# Patient Record
(demographics unavailable — no encounter records)

---

## 2024-11-01 NOTE — REVIEW OF SYSTEMS
[Diarrhea: Grade 0] : Diarrhea: Grade 0 [Joint Pain] : joint pain [Muscle Pain] : muscle pain [Negative] : Allergic/Immunologic

## 2024-11-01 NOTE — REASON FOR VISIT
[Other Location: e.g. School (Enter Location, City,State)___] : at [unfilled], at the time of the visit. [Medical Office: ([facility])___] : at the medical office located in

## 2024-11-01 NOTE — REASON FOR VISIT
[Other Location: e.g. School (Enter Location, City,State)___] : at [unfilled], at the time of the visit. [[place]] : at the medical office located in

## 2024-11-01 NOTE — REASON FOR VISIT
[Other Location: e.g. School (Enter Location, City,State)___] : at [unfilled], at the time of the visit. [Medical Office: ([facility])___] : at the medical office located in

## 2024-11-01 NOTE — REASON FOR VISIT
[Other Location: e.g. School (Enter Location, City,State)___] : at [unfilled], at the time of the visit. [Medical Office: ([facility])___] : at the medical office located in

## 2024-11-05 NOTE — BEGINNING OF VISIT
[0] : 2) Feeling down, depressed, or hopeless: Not at all (0) [[id number]] : 0 [Pain Scale: ___] : On a scale of 1-10, today the patient's pain is a(n) [unfilled]. [Never] : Never [Reviewed, no changes] : Reviewed, no changes

## 2024-11-05 NOTE — BEGINNING OF VISIT
[0] : 2) Feeling down, depressed, or hopeless: Not at all (0) [[id number]] : 0 [Pain Scale: ___] : On a scale of 1-10, today the patient's pain is a(n) [unfilled]. [Never] : Never [Reviewed, no changes] : Reviewed, no changes

## 2024-11-05 NOTE — BEGINNING OF VISIT
[0] : 2) Feeling down, depressed, or hopeless: Not at all (0) [[id number]] : 0 [Pain Scale: ___] : On a scale of 1-10, today the patient's pain is a(n) [unfilled]. [Never] : Never [Reviewed, no changes] : Reviewed, no changes

## 2024-11-05 NOTE — BEGINNING OF VISIT
[0] : 2) Feeling down, depressed, or hopeless: Not at all (0) [[id number]] : 0 [Pain Scale: ___] : On a scale of 1-10, today the patient's pain is a(n) [unfilled]. [Never] : Never [Reviewed, no changes] : Reviewed, no changes

## 2024-11-07 NOTE — HISTORY OF PRESENT ILLNESS
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 Developed antibodies stopped exchange transfusions at [place].  Seen  for transplant and gene therapy.  Has not had blood transfusions since March. Patient previously on chelation, no longer on.  Patient previously known from [place] Patient stopped oxbryta.  Taking hydroxyurea 1000mg and folic acid.    Denies CVA, Leg ulcers, Diabetes, thrombosis, cholecystectomy, AVN w/wo surgery, retinopathy, myocardial infarction, kidney disease. Has had DVT in left leg. History of acute chest syndrome without intubation. History of CVA- neurosurgery.      10/02/2024 Patient  has been hospitalized for 2 days. Has pneumonia.   [de-identified] : patient here for follow-up discharged 10/23/24. had pain a couple of days ago when takes a deep breathe.  Was on high flow oxygen Developed antibodies from exchange transfusions. Had ICU stay

## 2024-11-07 NOTE — ASSESSMENT
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 Tapia know we will add methadone Dilaudid currently suggested methadone   #Sickle cell disease discussed disease modifying medications including hydroxyurea, l-glutamine, voxelotor, crizanlizumab continue hydroxyurea discussed oxbryta discontinuation, will reach out to [place] for pyruvate kinase activator Patient is  Candidate for transplant/gene therapy, but limiting factors are transfusion antibodies, history of sickle hepatopathy, and prior iron overload Has improved bilirubin Given [place] info for gene therapy, saw Dr. Patiño, patient sister is haploidentical      #Pain NOT GIVEN PAIN CONTRACT discussed long acting methadone with Dr. Tapia and patient explained to patient unless he comes in person, I cannot prescribe Istop# pain plan for ED    #Iron overload -does not recall last MRI   #HCM Last optho appointment Last pneumonia vaccine Last urine protein/microalbumin Has PCP/OB/gyn Will refer to retina specialist: [facility] [place] instead. The number is [phone] located on [address] [place] [address], [address], [place] [address]   Martina Sauer MD [address] [place] [address] [address], [place], [place] [id number] [phone]       Solitario Canela MD Ophthalmology [phone] [address], [address] Floor, [place] [address]           #Next appointment Telehealth   #Labs at next appointment CBC, retic, iron, ferritin, urinalysis, urine toxicology, hgb electrophoresis, CMP, LDH

## 2024-11-07 NOTE — HISTORY OF PRESENT ILLNESS
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 Developed antibodies stopped exchange transfusions at [place].  Seen  for transplant and gene therapy.  Has not had blood transfusions since March. Patient previously on chelation, no longer on.  Patient previously known from [place] Patient stopped oxbryta.  Taking hydroxyurea 1000mg and folic acid.    Denies CVA, Leg ulcers, Diabetes, thrombosis, cholecystectomy, AVN w/wo surgery, retinopathy, myocardial infarction, kidney disease. Has had DVT in left leg. History of acute chest syndrome without intubation. History of CVA- neurosurgery.      10/02/2024 Patient  has been hospitalized for 2 days. Has pneumonia.   [de-identified] : patient here for follow-up discharged 10/23/24. had pain a couple of days ago when takes a deep breathe.  Was on high flow oxygen Developed antibodies from exchange transfusions. Had ICU stay

## 2024-11-07 NOTE — HISTORY OF PRESENT ILLNESS
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 Developed antibodies stopped exchange transfusions at [place].  Seen  for transplant and gene therapy.  Has not had blood transfusions since March. Patient previously on chelation, no longer on.  Patient previously known from [place] Patient stopped oxbryta.  Taking hydroxyurea 1000mg and folic acid.    Denies CVA, Leg ulcers, Diabetes, thrombosis, cholecystectomy, AVN w/wo surgery, retinopathy, myocardial infarction, kidney disease. Has had DVT in left leg. History of acute chest syndrome without intubation. History of CVA- neurosurgery.      10/02/2024 Patient  has been hospitalized for 2 days. Has pneumonia.   [de-identified] : patient here for follow-up discharged 10/23/24. had pain a couple of days ago when takes a deep breathe.  Was on high flow oxygen Developed antibodies from exchange transfusions. Had ICU stay

## 2024-11-07 NOTE — ASSESSMENT
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 Tapia know we will add methadone Dilaudid currently suggested methadone   #Sickle cell disease discussed disease modifying medications including hydroxyurea, l-glutamine, voxelotor, crizanlizumab continue hydroxyurea discussed oxbryta discontinuation, will reach out to [place] for pyruvate kinase activator Patient is  Candidate for transplant/gene therapy, but limiting factors are transfusion antibodies, history of sickle hepatopathy, and prior iron overload Has improved bilirubin Given [place] info for gene therapy, saw Dr. Patiño, patient sister is haploidentical      #Pain NOT GIVEN PAIN CONTRACT discussed long acting methadone with Dr. Tapia and patient explained to patient unless he comes in person, I cannot prescribe Istop# pain plan for ED    #Iron overload -does not recall last MRI   #HCM Last optho appointment Last pneumonia vaccine Last urine protein/microalbumin Has PCP/OB/gyn Will refer to retina specialist: [facility] [place] instead. The number is [phone] located on [address] [place] [address], [address], [place] [address]   Maritna Sauer MD [address] [place] [address] [address], [place], [place] [id number] [phone]       Solitario Canela MD Ophthalmology [phone] [address] Floor, [place] [address]           #Next appointment Telehealth   #Labs at next appointment CBC, retic, iron, ferritin, urinalysis, urine toxicology, hgb electrophoresis, CMP, LDH

## 2024-11-07 NOTE — HISTORY OF PRESENT ILLNESS
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 Developed antibodies stopped exchange transfusions at [place].  Seen  for transplant and gene therapy.  Has not had blood transfusions since March. Patient previously on chelation, no longer on.  Patient previously known from [place] Patient stopped oxbryta.  Taking hydroxyurea 1000mg and folic acid.    Denies CVA, Leg ulcers, Diabetes, thrombosis, cholecystectomy, AVN w/wo surgery, retinopathy, myocardial infarction, kidney disease. Has had DVT in left leg. History of acute chest syndrome without intubation. History of CVA- neurosurgery.      10/02/2024 Patient  has been hospitalized for 2 days. Has pneumonia.   [de-identified] : patient here for follow-up discharged 10/23/24. had pain a couple of days ago when takes a deep breathe.  Was on high flow oxygen Developed antibodies from exchange transfusions. Had ICU stay

## 2024-11-07 NOTE — ASSESSMENT
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 Tapia know we will add methadone Dilaudid currently suggested methadone   #Sickle cell disease discussed disease modifying medications including hydroxyurea, l-glutamine, voxelotor, crizanlizumab continue hydroxyurea discussed oxbryta discontinuation, will reach out to [place] for pyruvate kinase activator Patient is  Candidate for transplant/gene therapy, but limiting factors are transfusion antibodies, history of sickle hepatopathy, and prior iron overload Has improved bilirubin Given [place] info for gene therapy, saw Dr. Patiño, patient sister is haploidentical      #Pain NOT GIVEN PAIN CONTRACT discussed long acting methadone with Dr. Tapia and patient explained to patient unless he comes in person, I cannot prescribe Istop# pain plan for ED    #Iron overload -does not recall last MRI   #HCM Last optho appointment Last pneumonia vaccine Last urine protein/microalbumin Has PCP/OB/gyn Will refer to retina specialist: [facility] [place] instead. The number is [phone] located on [address] [place] [address], [address], [place] [address]   Martina Sauer MD [address] [place] [address] [address], [place], [place] [id number] [phone]       Solitario Canela MD Ophthalmology [phone] [address] Floor, [place] [address]           #Next appointment Telehealth   #Labs at next appointment CBC, retic, iron, ferritin, urinalysis, urine toxicology, hgb electrophoresis, CMP, LDH

## 2024-11-07 NOTE — ASSESSMENT
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 Tapia know we will add methadone Dilaudid currently suggested methadone   #Sickle cell disease discussed disease modifying medications including hydroxyurea, l-glutamine, voxelotor, crizanlizumab continue hydroxyurea discussed oxbryta discontinuation, will reach out to [place] for pyruvate kinase activator Patient is  Candidate for transplant/gene therapy, but limiting factors are transfusion antibodies, history of sickle hepatopathy, and prior iron overload Has improved bilirubin Given [place] info for gene therapy, saw Dr. Patiño, patient sister is haploidentical      #Pain NOT GIVEN PAIN CONTRACT discussed long acting methadone with Dr. Tapia and patient explained to patient unless he comes in person, I cannot prescribe Istop# pain plan for ED    #Iron overload -does not recall last MRI   #HCM Last optho appointment Last pneumonia vaccine Last urine protein/microalbumin Has PCP/OB/gyn Will refer to retina specialist: [facility] [place] instead. The number is [phone] located on [address] [place] [address], [address], [place] [address]   Martina Sauer MD [address] [place] [address] [address], [place], [place] [id number] [phone]       Solitario Canela MD Ophthalmology [phone] [address] Floor, [place] [address]           #Next appointment Telehealth   #Labs at next appointment CBC, retic, iron, ferritin, urinalysis, urine toxicology, hgb electrophoresis, CMP, LDH

## 2024-12-31 NOTE — HISTORY OF PRESENT ILLNESS
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 Developed antibodies stopped exchange transfusions at [place].  Seen  for transplant and gene therapy.  Has not had blood transfusions since March. Patient previously on chelation, no longer on.  Patient previously known from [place] Patient stopped oxbryta.  Taking hydroxyurea 1000mg and folic acid.    Denies CVA, Leg ulcers, Diabetes, thrombosis, cholecystectomy, AVN w/wo surgery, retinopathy, myocardial infarction, kidney disease. Has had DVT in left leg. History of acute chest syndrome without intubation. History of CVA- neurosurgery.      10/02/2024 Patient  has been hospitalized for 2 days. Has pneumonia.   [de-identified] : patient here for follow-up discharged 10/23/24. had pain a couple of days ago when takes a deep breathe.  Was on high flow oxygen Developed antibodies from exchange transfusions. Had ICU stay

## 2024-12-31 NOTE — REASON FOR VISIT
[Other Location: e.g. School (Enter Location, City,State)___] : at [unfilled], at the time of the visit. [Medical Office: ([facility])___] : at the medical office located in

## 2024-12-31 NOTE — ASSESSMENT
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 Tapia know we will add methadone Dilaudid currently suggested methadone   #Sickle cell disease discussed disease modifying medications including hydroxyurea, l-glutamine, voxelotor, crizanlizumab continue hydroxyurea discussed oxbryta discontinuation, will reach out to [place] for pyruvate kinase activator Patient is  Candidate for transplant/gene therapy, but limiting factors are transfusion antibodies, history of sickle hepatopathy, and prior iron overload Has improved bilirubin Given [place] info for gene therapy, saw Dr. Patiño, patient sister is haploidentical      #Pain NOT GIVEN PAIN CONTRACT discussed long acting methadone with Dr. Tapia and patient explained to patient unless he comes in person, I cannot prescribe Istop# pain plan for ED    #Iron overload -does not recall last MRI   #HCM Last optho appointment Last pneumonia vaccine Last urine protein/microalbumin Has PCP/OB/gyn Will refer to retina specialist: [facility] [place] instead. The number is [phone] located on [address] [place] [address], [address], [place] [address]   Martina Sauer MD [address] [place] [address] [address], [place], [place] [id number] [phone]       Solitario Canela MD Ophthalmology [phone] [address], [place] [address]           #Next appointment Telehealth   #Labs at next appointment CBC, retic, iron, ferritin, urinalysis, urine toxicology, hgb electrophoresis, CMP, LDH